# Patient Record
Sex: MALE | Race: WHITE
[De-identification: names, ages, dates, MRNs, and addresses within clinical notes are randomized per-mention and may not be internally consistent; named-entity substitution may affect disease eponyms.]

---

## 2022-06-24 ENCOUNTER — APPOINTMENT (OUTPATIENT)
Dept: ORTHOPEDIC SURGERY | Facility: CLINIC | Age: 82
End: 2022-06-24
Payer: MEDICARE

## 2022-06-24 VITALS — HEIGHT: 66 IN | WEIGHT: 185 LBS | BODY MASS INDEX: 29.73 KG/M2

## 2022-06-24 DIAGNOSIS — Z78.9 OTHER SPECIFIED HEALTH STATUS: ICD-10-CM

## 2022-06-24 DIAGNOSIS — Z96.642 PRESENCE OF LEFT ARTIFICIAL HIP JOINT: ICD-10-CM

## 2022-06-24 PROBLEM — Z00.00 ENCOUNTER FOR PREVENTIVE HEALTH EXAMINATION: Status: ACTIVE | Noted: 2022-06-24

## 2022-06-24 PROCEDURE — 99213 OFFICE O/P EST LOW 20 MIN: CPT

## 2022-06-24 PROCEDURE — 73502 X-RAY EXAM HIP UNI 2-3 VIEWS: CPT | Mod: LT

## 2022-06-24 RX ORDER — AMOXICILLIN 500 MG/1
500 CAPSULE ORAL
Qty: 20 | Refills: 0 | Status: ACTIVE | COMMUNITY
Start: 2022-01-06

## 2022-06-24 RX ORDER — DOXAZOSIN 4 MG/1
4 TABLET ORAL
Qty: 90 | Refills: 0 | Status: ACTIVE | COMMUNITY
Start: 2022-01-19

## 2022-06-24 RX ORDER — TADALAFIL 5 MG/1
5 TABLET ORAL
Qty: 90 | Refills: 0 | Status: ACTIVE | COMMUNITY
Start: 2022-05-09

## 2022-06-24 RX ORDER — ATORVASTATIN CALCIUM 20 MG/1
20 TABLET, FILM COATED ORAL
Qty: 90 | Refills: 0 | Status: ACTIVE | COMMUNITY
Start: 2022-01-19

## 2022-06-24 NOTE — ASSESSMENT
[FreeTextEntry1] : Patient understands that they should take a dose of antibiotics one hour prior to dental procedures or colonoscopies for the rest of their lives or until current recommendations change. \par \par \par Patient denies leg length diffrence\par \par f/u 2 years\par

## 2022-06-24 NOTE — PHYSICAL EXAM
[NL (45)] : abduction 45 degrees [5___] : adduction 5[unfilled]/5 [] : patient ambulates without assistive device [Left] : left hip with pelvis [AP] : anteroposterior [Components well fixed, in good position] : Components well fixed, in good position [TWNoteComboBox7] : flexion 110 degrees [de-identified] : external rotation 40 degrees [TWNoteComboBox6] : internal rotation 30 degrees

## 2022-06-24 NOTE — HISTORY OF PRESENT ILLNESS
[de-identified] : 9/20/19 doing well no complaints, leg lengths feel good. no meds, feels like he can stop therapy at this point\par 12/20/19 doing well. stopped therapy. no meds.0/10 pain on average\par 6/19/20 on average 0/10 pain. some lateral pain intermittently, leg lengths "seem equal ", getting abx before dental\par work\par \par 6/24/22 Patient is f/u Left WENDY 6/24/19. patient reports hip feels great and no problems. Reports full ROM. Getting all abx before dental work.

## 2023-12-12 ENCOUNTER — OFFICE (OUTPATIENT)
Facility: LOCATION | Age: 83
Setting detail: OPHTHALMOLOGY
End: 2023-12-12
Payer: MEDICARE

## 2023-12-12 ENCOUNTER — RX ONLY (RX ONLY)
Age: 83
End: 2023-12-12

## 2023-12-12 DIAGNOSIS — H43.393: ICD-10-CM

## 2023-12-12 DIAGNOSIS — H16.223: ICD-10-CM

## 2023-12-12 DIAGNOSIS — H35.3132: ICD-10-CM

## 2023-12-12 DIAGNOSIS — H31.29: ICD-10-CM

## 2023-12-12 PROBLEM — Z96.1 PSEUDOPHAKIA: Status: ACTIVE | Noted: 2023-12-12

## 2023-12-12 PROCEDURE — 92014 COMPRE OPH EXAM EST PT 1/>: CPT | Performed by: OPHTHALMOLOGY

## 2023-12-12 PROCEDURE — 92134 CPTRZ OPH DX IMG PST SGM RTA: CPT | Performed by: OPHTHALMOLOGY

## 2023-12-12 ASSESSMENT — CONFRONTATIONAL VISUAL FIELD TEST (CVF)
OD_FINDINGS: FULL
OS_FINDINGS: FULL

## 2023-12-12 ASSESSMENT — SUPERFICIAL PUNCTATE KERATITIS (SPK)
OD_SPK: T
OS_SPK: T

## 2023-12-12 ASSESSMENT — CORNEAL DYSTROPHY - POSTERIOR
OS_POSTERIORDYSTROPHY: GUTTATA
OD_POSTERIORDYSTROPHY: GUTTATA

## 2023-12-13 PROBLEM — H31.29 PERIPAPILLARY ATROPHY ; BOTH EYES: Status: ACTIVE | Noted: 2023-12-12

## 2023-12-15 ASSESSMENT — REFRACTION_CURRENTRX
OS_ADD: +2.75
OS_AXIS: 143
OD_AXIS: 060
OD_ADD: +2.75
OS_CYLINDER: +0.75
OD_CYLINDER: +1.50
OS_OVR_VA: 20/
OD_OVR_VA: 20/
OS_SPHERE: -2.00
OD_SPHERE: -1.00

## 2023-12-15 ASSESSMENT — SPHEQUIV_DERIVED
OD_SPHEQUIV: -0.875
OS_SPHEQUIV: -0.5

## 2023-12-15 ASSESSMENT — REFRACTION_AUTOREFRACTION
OS_CYLINDER: +0.50
OS_SPHERE: -0.75
OD_CYLINDER: +1.75
OD_AXIS: 050
OS_AXIS: 147
OD_SPHERE: -1.75

## 2024-06-11 ENCOUNTER — OFFICE (OUTPATIENT)
Facility: LOCATION | Age: 84
Setting detail: OPHTHALMOLOGY
End: 2024-06-11
Payer: MEDICARE

## 2024-06-11 DIAGNOSIS — H16.223: ICD-10-CM

## 2024-06-11 DIAGNOSIS — H35.3132: ICD-10-CM

## 2024-06-11 DIAGNOSIS — H31.29: ICD-10-CM

## 2024-06-11 DIAGNOSIS — H52.4: ICD-10-CM

## 2024-06-11 PROBLEM — H52.13 MYOPIA; BOTH EYES: Status: ACTIVE | Noted: 2024-06-11

## 2024-06-11 PROCEDURE — 92014 COMPRE OPH EXAM EST PT 1/>: CPT | Performed by: OPHTHALMOLOGY

## 2024-06-11 PROCEDURE — 92134 CPTRZ OPH DX IMG PST SGM RTA: CPT | Performed by: OPHTHALMOLOGY

## 2024-06-11 PROCEDURE — 92015 DETERMINE REFRACTIVE STATE: CPT | Mod: GA | Performed by: OPHTHALMOLOGY

## 2024-06-11 ASSESSMENT — CONFRONTATIONAL VISUAL FIELD TEST (CVF)
OD_FINDINGS: FULL
OS_FINDINGS: FULL

## 2024-12-06 ENCOUNTER — OFFICE (OUTPATIENT)
Facility: LOCATION | Age: 84
Setting detail: OPHTHALMOLOGY
End: 2024-12-06
Payer: MEDICARE

## 2024-12-06 DIAGNOSIS — H16.223: ICD-10-CM

## 2024-12-06 DIAGNOSIS — H35.3132: ICD-10-CM

## 2024-12-06 DIAGNOSIS — H31.29: ICD-10-CM

## 2024-12-06 PROBLEM — H31.001 CHORIORETINAL SCARS; RIGHT EYE: Status: ACTIVE | Noted: 2024-12-06

## 2024-12-06 PROCEDURE — 92134 CPTRZ OPH DX IMG PST SGM RTA: CPT | Performed by: OPHTHALMOLOGY

## 2024-12-06 PROCEDURE — 99213 OFFICE O/P EST LOW 20 MIN: CPT | Performed by: OPHTHALMOLOGY

## 2024-12-06 ASSESSMENT — CORNEAL DYSTROPHY - POSTERIOR
OD_POSTERIORDYSTROPHY: GUTTATA
OS_POSTERIORDYSTROPHY: GUTTATA

## 2024-12-06 ASSESSMENT — SUPERFICIAL PUNCTATE KERATITIS (SPK)
OS_SPK: T
OD_SPK: T

## 2024-12-06 ASSESSMENT — CONFRONTATIONAL VISUAL FIELD TEST (CVF)
OD_FINDINGS: FULL
OS_FINDINGS: FULL

## 2024-12-19 ASSESSMENT — REFRACTION_MANIFEST
OS_ADD: +2.75
OD_VA2: 20/25(J1)
OD_CYLINDER: +1.50
OD_VA1: 20/25-1
OS_VA2: 20/25(J1)
OS_SPHERE: -2.00
OD_ADD: +2.75
OS_AXIS: 145
OD_SPHERE: -1.00
OS_VA1: 20/30-2
OD_AXIS: 60
OS_CYLINDER: +0.75

## 2024-12-19 ASSESSMENT — REFRACTION_AUTOREFRACTION
OD_AXIS: 055
OD_CYLINDER: +2.25
OS_CYLINDER: +1.25
OD_SPHERE: -1.25
OS_AXIS: 149
OS_SPHERE: -1.75

## 2024-12-19 ASSESSMENT — REFRACTION_CURRENTRX
OD_CYLINDER: +2.00
OS_ADD: +2.75
OD_SPHERE: -1.00
OS_SPHERE: -2.00
OD_OVR_VA: 20/
OS_OVR_VA: 20/
OS_CYLINDER: +0.75
OS_AXIS: 144
OD_AXIS: 062
OD_ADD: +2.75

## 2024-12-19 ASSESSMENT — KERATOMETRY
OS_K1POWER_DIOPTERS: 46.25
OS_AXISANGLE_DEGREES: 139
OS_K2POWER_DIOPTERS: 47.00
OD_K2POWER_DIOPTERS: 47.00
OD_AXISANGLE_DEGREES: 070
OD_K1POWER_DIOPTERS: 44.75

## 2024-12-19 ASSESSMENT — VISUAL ACUITY
OS_BCVA: 20/25-1
OD_BCVA: 20/40-1

## 2025-06-06 ENCOUNTER — OFFICE (OUTPATIENT)
Facility: LOCATION | Age: 85
Setting detail: OPHTHALMOLOGY
End: 2025-06-06
Payer: MEDICARE

## 2025-06-06 DIAGNOSIS — H16.223: ICD-10-CM

## 2025-06-06 DIAGNOSIS — H31.29: ICD-10-CM

## 2025-06-06 DIAGNOSIS — H35.3132: ICD-10-CM

## 2025-06-06 PROCEDURE — 92134 CPTRZ OPH DX IMG PST SGM RTA: CPT | Performed by: OPHTHALMOLOGY

## 2025-06-06 PROCEDURE — 92014 COMPRE OPH EXAM EST PT 1/>: CPT | Performed by: OPHTHALMOLOGY

## 2025-06-06 ASSESSMENT — CORNEAL DYSTROPHY - POSTERIOR
OD_POSTERIORDYSTROPHY: GUTTATA
OS_POSTERIORDYSTROPHY: GUTTATA

## 2025-06-06 ASSESSMENT — TONOMETRY
OS_IOP_MMHG: 17
OD_IOP_MMHG: 17

## 2025-06-06 ASSESSMENT — CONFRONTATIONAL VISUAL FIELD TEST (CVF)
OD_FINDINGS: FULL
OS_FINDINGS: FULL

## 2025-06-06 ASSESSMENT — SUPERFICIAL PUNCTATE KERATITIS (SPK)
OD_SPK: T
OS_SPK: T

## 2025-06-09 ASSESSMENT — REFRACTION_AUTOREFRACTION
OD_AXIS: 065
OD_CYLINDER: +1.50
OD_SPHERE: -0.25
OS_AXIS: 147
OS_CYLINDER: +0.75
OS_SPHERE: -1.75

## 2025-06-09 ASSESSMENT — REFRACTION_CURRENTRX
OD_ADD: +2.75
OD_OVR_VA: 20/
OS_AXIS: 143
OD_AXIS: 064
OD_CYLINDER: +1.75
OD_SPHERE: -1.00
OS_OVR_VA: 20/
OS_ADD: +2.75
OS_CYLINDER: +0.75
OS_SPHERE: -2.00

## 2025-06-09 ASSESSMENT — KERATOMETRY
OD_K2POWER_DIOPTERS: 47.00
OS_K2POWER_DIOPTERS: 47.00
OD_AXISANGLE_DEGREES: 070
OS_AXISANGLE_DEGREES: 139
OS_K1POWER_DIOPTERS: 46.25
OD_K1POWER_DIOPTERS: 44.75

## 2025-06-09 ASSESSMENT — REFRACTION_MANIFEST
OS_ADD: +2.75
OS_CYLINDER: +0.75
OD_VA2: 20/25(J1)
OD_CYLINDER: +1.50
OD_SPHERE: -1.00
OS_SPHERE: -2.00
OS_AXIS: 145
OD_ADD: +2.75
OD_VA1: 20/25-1
OD_AXIS: 60
OS_VA2: 20/25(J1)
OS_VA1: 20/30-2

## 2025-06-09 ASSESSMENT — VISUAL ACUITY
OD_BCVA: 20/30-2
OS_BCVA: 20/30+2